# Patient Record
Sex: FEMALE | Race: BLACK OR AFRICAN AMERICAN
[De-identification: names, ages, dates, MRNs, and addresses within clinical notes are randomized per-mention and may not be internally consistent; named-entity substitution may affect disease eponyms.]

---

## 2017-01-17 ENCOUNTER — CHARTING TRANS (OUTPATIENT)
Dept: OTHER | Age: 52
End: 2017-01-17

## 2017-02-10 ENCOUNTER — HOSPITAL ENCOUNTER (EMERGENCY)
Facility: HOSPITAL | Age: 52
Discharge: LEFT BEFORE DISCHARGE | End: 2017-02-10
Attending: EMERGENCY MEDICINE
Payer: MEDICAID

## 2017-02-10 ENCOUNTER — PRIOR ORIGINAL RECORDS (OUTPATIENT)
Dept: OTHER | Age: 52
End: 2017-02-10

## 2017-02-10 ENCOUNTER — APPOINTMENT (OUTPATIENT)
Dept: GENERAL RADIOLOGY | Facility: HOSPITAL | Age: 52
End: 2017-02-10
Attending: EMERGENCY MEDICINE
Payer: MEDICAID

## 2017-02-10 VITALS
WEIGHT: 108 LBS | SYSTOLIC BLOOD PRESSURE: 124 MMHG | TEMPERATURE: 98 F | BODY MASS INDEX: 19.88 KG/M2 | HEART RATE: 76 BPM | RESPIRATION RATE: 18 BRPM | OXYGEN SATURATION: 98 % | DIASTOLIC BLOOD PRESSURE: 50 MMHG | HEIGHT: 62 IN

## 2017-02-10 DIAGNOSIS — R07.9 CHEST PAIN OF UNCERTAIN ETIOLOGY: Primary | ICD-10-CM

## 2017-02-10 LAB
ALBUMIN SERPL-MCNC: 3.8 G/DL (ref 3.5–4.8)
ALP LIVER SERPL-CCNC: 59 U/L (ref 39–100)
ALT SERPL-CCNC: 41 U/L (ref 14–54)
AST SERPL-CCNC: 17 U/L (ref 15–41)
ATRIAL RATE: 86 BPM
BASOPHILS # BLD AUTO: 0.01 X10(3) UL (ref 0–0.1)
BASOPHILS NFR BLD AUTO: 0.1 %
BILIRUB SERPL-MCNC: 0.2 MG/DL (ref 0.1–2)
BILIRUB UR QL STRIP.AUTO: NEGATIVE
BUN BLD-MCNC: 9 MG/DL (ref 8–20)
CALCIUM BLD-MCNC: 8.3 MG/DL (ref 8.3–10.3)
CHLORIDE: 105 MMOL/L (ref 101–111)
CLARITY UR REFRACT.AUTO: CLEAR
CO2: 28 MMOL/L (ref 22–32)
CREAT BLD-MCNC: 0.75 MG/DL (ref 0.55–1.02)
D-DIMER: <0.27 UG/ML FEU (ref 0–0.49)
EOSINOPHIL # BLD AUTO: 0.03 X10(3) UL (ref 0–0.3)
EOSINOPHIL NFR BLD AUTO: 0.4 %
ERYTHROCYTE [DISTWIDTH] IN BLOOD BY AUTOMATED COUNT: 12.9 % (ref 11.5–16)
GLUCOSE BLD-MCNC: 85 MG/DL (ref 70–99)
GLUCOSE UR STRIP.AUTO-MCNC: NEGATIVE MG/DL
HCT VFR BLD AUTO: 39.2 % (ref 34–50)
HGB BLD-MCNC: 12.9 G/DL (ref 12–16)
IMMATURE GRANULOCYTE COUNT: 0.02 X10(3) UL (ref 0–1)
IMMATURE GRANULOCYTE RATIO %: 0.3 %
KETONES UR STRIP.AUTO-MCNC: NEGATIVE MG/DL
LEUKOCYTE ESTERASE UR QL STRIP.AUTO: NEGATIVE
LYMPHOCYTES # BLD AUTO: 1.72 X10(3) UL (ref 0.9–4)
LYMPHOCYTES NFR BLD AUTO: 25 %
M PROTEIN MFR SERPL ELPH: 7.4 G/DL (ref 6.1–8.3)
MCH RBC QN AUTO: 29.6 PG (ref 27–33.2)
MCHC RBC AUTO-ENTMCNC: 32.9 G/DL (ref 31–37)
MCV RBC AUTO: 89.9 FL (ref 81–100)
MONOCYTES # BLD AUTO: 0.43 X10(3) UL (ref 0.1–0.6)
MONOCYTES NFR BLD AUTO: 6.3 %
NEUTROPHIL ABS PRELIM: 4.66 X10 (3) UL (ref 1.3–6.7)
NEUTROPHILS # BLD AUTO: 4.66 X10(3) UL (ref 1.3–6.7)
NEUTROPHILS NFR BLD AUTO: 67.9 %
NITRITE UR QL STRIP.AUTO: NEGATIVE
P AXIS: 71 DEGREES
P-R INTERVAL: 162 MS
PH UR STRIP.AUTO: 7 [PH] (ref 4.5–8)
PLATELET # BLD AUTO: 207 10(3)UL (ref 150–450)
POTASSIUM SERPL-SCNC: 3.4 MMOL/L (ref 3.6–5.1)
PROT UR STRIP.AUTO-MCNC: NEGATIVE MG/DL
Q-T INTERVAL: 400 MS
QRS DURATION: 80 MS
QTC CALCULATION (BEZET): 478 MS
R AXIS: 46 DEGREES
RBC # BLD AUTO: 4.36 X10(6)UL (ref 3.8–5.1)
RBC UR QL AUTO: NEGATIVE
RED CELL DISTRIBUTION WIDTH-SD: 42.5 FL (ref 35.1–46.3)
SODIUM SERPL-SCNC: 139 MMOL/L (ref 136–144)
SP GR UR STRIP.AUTO: 1.01 (ref 1–1.03)
T AXIS: 74 DEGREES
TROPONIN: <0.046 NG/ML (ref ?–0.05)
UROBILINOGEN UR STRIP.AUTO-MCNC: <2 MG/DL
VENTRICULAR RATE: 86 BPM
WBC # BLD AUTO: 6.9 X10(3) UL (ref 4–13)

## 2017-02-10 PROCEDURE — 85025 COMPLETE CBC W/AUTO DIFF WBC: CPT | Performed by: EMERGENCY MEDICINE

## 2017-02-10 PROCEDURE — 85378 FIBRIN DEGRADE SEMIQUANT: CPT | Performed by: EMERGENCY MEDICINE

## 2017-02-10 PROCEDURE — 81003 URINALYSIS AUTO W/O SCOPE: CPT | Performed by: EMERGENCY MEDICINE

## 2017-02-10 PROCEDURE — 93005 ELECTROCARDIOGRAM TRACING: CPT

## 2017-02-10 PROCEDURE — 99285 EMERGENCY DEPT VISIT HI MDM: CPT

## 2017-02-10 PROCEDURE — 96374 THER/PROPH/DIAG INJ IV PUSH: CPT

## 2017-02-10 PROCEDURE — 93010 ELECTROCARDIOGRAM REPORT: CPT

## 2017-02-10 PROCEDURE — 84484 ASSAY OF TROPONIN QUANT: CPT | Performed by: EMERGENCY MEDICINE

## 2017-02-10 PROCEDURE — 80053 COMPREHEN METABOLIC PANEL: CPT | Performed by: EMERGENCY MEDICINE

## 2017-02-10 PROCEDURE — 71020 XR CHEST PA + LAT CHEST (CPT=71020): CPT

## 2017-02-10 RX ORDER — KETOROLAC TROMETHAMINE 30 MG/ML
30 INJECTION, SOLUTION INTRAMUSCULAR; INTRAVENOUS ONCE
Status: COMPLETED | OUTPATIENT
Start: 2017-02-10 | End: 2017-02-10

## 2017-02-10 RX ORDER — DIAZEPAM 5 MG/1
5 TABLET ORAL EVERY 6 HOURS PRN
COMMUNITY
End: 2017-03-13

## 2017-02-10 RX ORDER — OMEPRAZOLE 20 MG/1
20 CAPSULE, DELAYED RELEASE ORAL
COMMUNITY
End: 2017-03-13

## 2017-02-10 RX ORDER — GABAPENTIN 100 MG/1
100 CAPSULE ORAL 3 TIMES DAILY
COMMUNITY
End: 2017-03-13

## 2017-02-10 RX ORDER — CHOLECALCIFEROL (VITAMIN D3) 1250 MCG
CAPSULE ORAL
COMMUNITY

## 2017-02-10 RX ORDER — ONDANSETRON 4 MG/1
4 TABLET, ORALLY DISINTEGRATING ORAL EVERY 8 HOURS PRN
COMMUNITY
End: 2017-03-13

## 2017-02-10 NOTE — ED NOTES
Pt awake and alert, appears comfortable and in no distress. Stressed to pt importance of follow up related to chest pain. Pt verbalizes understanding. Pt d/c'd with belongings with steady gait.

## 2017-02-10 NOTE — ED INITIAL ASSESSMENT (HPI)
Pt presents to the ED with complaints of pressure to the chest which has been constant for past month. Pt also c/o burning pain to hands. Pt states she was seen at [facility] ER for same pain approximately 3 weeks ago and was told everything was ok.  Pt states sh

## 2017-02-10 NOTE — ED NOTES
Pt states she needs to leave to  her children. Pt states, \"I just realized it was my day to pick them up and they are waiting for me\".  Discussed with Dr. Suki Graff and if pt would like to leave at this time then pt will need to sign out against medic

## 2017-02-10 NOTE — ED PROVIDER NOTES
Patient Seen in: [facility] Emergency Department    History   Patient presents with:  Chest Pain Angina (cardiovascular)    Stated Complaint: chest pain    HPI    60-year-old -American female who presents the emergency room today for bit of c Packs/Day: 0.00  Years:           Review of Systems    Positive for stated complaint: chest pain  Other systems are as noted in HPI. Constitutional and vital signs reviewed. All other systems reviewed and negative except as noted above.     PSF value of  approximately 95% when results are used as part of the total clinical  evaluation of the patient.     1st Trimester:  0.05-0.95 ug/mL (FEU)     2nd Trimester:  0.32-1.29 ug/mL (FEU)    3rd Trimester:  0.13-1.70 ug/mL (FEU)    In pregnant females, disposition on the patient and see if any further workup needed to be done urgently. Patient stated she still did not wish to stay and signed out [address]   Patient was advised that if despite chest pains worsen she should probably return to

## 2017-03-13 ENCOUNTER — HOSPITAL ENCOUNTER (OUTPATIENT)
Age: 52
Discharge: HOME | End: 2017-03-13
Attending: FAMILY MEDICINE
Payer: MEDICAID

## 2017-03-13 ENCOUNTER — NURSE ONLY (OUTPATIENT)
Dept: FAMILY MEDICINE CLINIC | Facility: CLINIC | Age: 52
End: 2017-03-13

## 2017-03-13 ENCOUNTER — APPOINTMENT (OUTPATIENT)
Dept: GENERAL RADIOLOGY | Age: 52
End: 2017-03-13
Attending: FAMILY MEDICINE
Payer: MEDICAID

## 2017-03-13 VITALS
OXYGEN SATURATION: 100 % | BODY MASS INDEX: 19.88 KG/M2 | DIASTOLIC BLOOD PRESSURE: 70 MMHG | HEART RATE: 85 BPM | TEMPERATURE: 99 F | RESPIRATION RATE: 16 BRPM | HEIGHT: 62 IN | SYSTOLIC BLOOD PRESSURE: 118 MMHG | WEIGHT: 108 LBS

## 2017-03-13 VITALS
TEMPERATURE: 99 F | HEIGHT: 62 IN | RESPIRATION RATE: 14 BRPM | SYSTOLIC BLOOD PRESSURE: 106 MMHG | OXYGEN SATURATION: 98 % | BODY MASS INDEX: 19.99 KG/M2 | DIASTOLIC BLOOD PRESSURE: 72 MMHG | HEART RATE: 85 BPM | WEIGHT: 108.63 LBS

## 2017-03-13 DIAGNOSIS — Z02.9 ENCOUNTERS FOR ADMINISTRATIVE PURPOSES: Primary | ICD-10-CM

## 2017-03-13 DIAGNOSIS — J11.1 INFLUENZA: Primary | ICD-10-CM

## 2017-03-13 PROCEDURE — 99213 OFFICE O/P EST LOW 20 MIN: CPT

## 2017-03-13 PROCEDURE — 71020 XR CHEST PA + LAT CHEST (CPT=71020): CPT

## 2017-03-13 PROCEDURE — 99214 OFFICE O/P EST MOD 30 MIN: CPT

## 2017-03-13 RX ORDER — OSELTAMIVIR PHOSPHATE 75 MG/1
75 CAPSULE ORAL 2 TIMES DAILY
Qty: 10 CAPSULE | Refills: 0 | Status: SHIPPED | OUTPATIENT
Start: 2017-03-13 | End: 2017-03-18

## 2017-03-13 NOTE — PROGRESS NOTES
S:  Pt reports URI sxs since yesterday. Pt reports cough (barky), nasal congestion, sinus pain/pressure, ear pain, yellow mucous, sore throat rated 8/10, chills, sweats, coughing fits, SOB (although explains baseline due to lupus), nausea, diarrhea.   Pt d

## 2017-03-13 NOTE — ED PROVIDER NOTES
Patient presents with:  Cough/URI  Diarrhea  Headache  Body ache and/or chills    HPI:     Jose Eduardo Perez is a 49year old female who presents with for chief complaint of fever, chills, nasal congestion, coryza, rhinorrhea, sore throat, chest congesti discharge, mild congestion, no sinus tenderness. No nasal flaring  Throat: lips, mucosa, and tongue normal; teeth and gums normal. Buccal mucosa moist.   Neck: no adenopathy  Lungs: clear to auscultation bilaterally.  No wheezing, rhonchi or crackles No alex STATED HISTORY:  Patient has had cough, congestion, chills, fever body aches, head ache and diarrhea since yesterday. FINDINGS:  Cardiac silhouette and pulmonary vasculature within normal limits. No focal consolidation, pneumothorax or pleural effusion.

## 2017-04-02 ENCOUNTER — HOSPITAL ENCOUNTER (OUTPATIENT)
Age: 52
Discharge: HOME | End: 2017-04-02
Payer: MEDICAID

## 2017-04-02 VITALS
OXYGEN SATURATION: 100 % | RESPIRATION RATE: 16 BRPM | WEIGHT: 105 LBS | SYSTOLIC BLOOD PRESSURE: 117 MMHG | DIASTOLIC BLOOD PRESSURE: 74 MMHG | TEMPERATURE: 99 F | HEART RATE: 64 BPM | BODY MASS INDEX: 19 KG/M2

## 2017-04-02 DIAGNOSIS — B35.1 TINEA OF NAIL: Primary | ICD-10-CM

## 2017-04-02 PROCEDURE — 99212 OFFICE O/P EST SF 10 MIN: CPT

## 2017-04-02 RX ORDER — MELOXICAM 10 MG/1
CAPSULE ORAL
COMMUNITY

## 2017-04-02 NOTE — ED PROVIDER NOTES
[place] [address] [facility]    History   Patient presents with:  Fungus Nails    Stated Complaint: finger infection/mold    HPI    50 yo female here with c/o greenish color to her R hand 4th digit nail after the acrylics were removed Constitutional: She is oriented to person, place, and time. She appears well-developed and well-nourished. HENT:   Head: Normocephalic. Eyes: Conjunctivae and EOM are normal. Pupils are equal, round, and reactive to light.    Neck: Normal range of motio

## 2017-05-10 ENCOUNTER — PRIOR ORIGINAL RECORDS (OUTPATIENT)
Dept: OTHER | Age: 52
End: 2017-05-10

## 2017-07-07 ENCOUNTER — PRIOR ORIGINAL RECORDS (OUTPATIENT)
Dept: OTHER | Age: 52
End: 2017-07-07

## 2017-07-24 LAB
BUN: 9 MG/DL
CALCIUM: 8.3 MG/DL
CHLORIDE: 105 MEQ/L
CREATININE, SERUM: 0.75 MG/DL
GLUCOSE: 85 MG/DL
HEMATOCRIT: 39.2 %
HEMOGLOBIN: 12.9 G/DL
MCH: 29.6 PG
MCHC: 32.9 G/DL
MCV: 89.9 FL
PLATELETS: 207 K/UL
POTASSIUM, SERUM: 3.4 MEQ/L
RED BLOOD COUNT: 4.36 X 10-6/U
SODIUM: 139 MEQ/L
WHITE BLOOD COUNT: 6.9 X 10-3/U

## 2017-07-25 ENCOUNTER — MYAURORA ACCOUNT LINK (OUTPATIENT)
Dept: OTHER | Age: 52
End: 2017-07-25

## 2017-07-25 ENCOUNTER — PRIOR ORIGINAL RECORDS (OUTPATIENT)
Dept: OTHER | Age: 52
End: 2017-07-25

## 2017-07-25 LAB
ALBUMIN: 4.1 G/DL
ALBUMIN: 4.3 G/DL
ALKALINE PHOSPHATATE(ALK PHOS): 45 IU/L
ALKALINE PHOSPHATATE(ALK PHOS): 51 IU/L
BILIRUBIN TOTAL: 0.3 MG/DL
BILIRUBIN TOTAL: 0.3 MG/DL
BUN: 14 MG/DL
BUN: 9 MG/DL
CALCIUM: 8.5 MG/DL
CALCIUM: 9.4 MG/DL
CHLORIDE: 102 MEQ/L
CHLORIDE: 104 MEQ/L
CREATININE, SERUM: 0.7 MG/DL
CREATININE, SERUM: 0.76 MG/DL
GLOBULIN: 3.1 G/DL
GLUCOSE: 113 MG/DL
GLUCOSE: 91 MG/DL
HEMATOCRIT: 38.7 %
HEMATOCRIT: 40.1 %
HEMOGLOBIN: 12.8 G/DL
HEMOGLOBIN: 13.2 G/DL
MCH: 28.8 PG
MCH: 28.9 PG
MCHC: 32.9 G/DL
MCHC: 32.9 G/DL
MCV: 87.6 FL
MCV: 87.9 FL
PLATELETS: 199 K/UL
PLATELETS: 203 K/UL
POTASSIUM, SERUM: 3.5 MEQ/L
POTASSIUM, SERUM: 3.6 MEQ/L
PROTEIN, TOTAL: 7.2 G/DL
PROTEIN, TOTAL: 7.3 G/DL
RED BLOOD COUNT: 4.42 X 10-6/U
RED BLOOD COUNT: 4.56 X 10-6/U
SGOT (AST): 16 IU/L
SGOT (AST): 18 IU/L
SGPT (ALT): 16 IU/L
SGPT (ALT): 29 IU/L
SODIUM: 139 MEQ/L
SODIUM: 140 MEQ/L
WHITE BLOOD COUNT: 5.6 X 10-3/U
WHITE BLOOD COUNT: 6.23 X 10-3/U

## 2017-09-20 NOTE — ED AVS SNAPSHOT
[facility] in [address] [address]    Phone:  [phone]    Fax:  [phone]           Mrs. Marium Alvarez   MRN: [id number]    Department:  [facility] in [facility]   Date of Visit:  3/13/2017 See prior TE. If you have any problems with your follow-up, please call our  at [phone]. Si usted tiene algun problema con de la rosa sequimiento, por favor llame a nuestro adminstrador de casos al [phone].     Expect to receive an electronic reques Yasemin Pacheco [address] ([address]) Veda ([address] [id number] [facility]   [facility] [address] ([address] [facility]) [address]  (Do PROCEDURE:  XR CHEST PA + LAT CHEST (CPT=71020)     INDICATIONS:  coughing chills fatigue headache chest very heavy     COMPARISON:  [facility] , XR CHEST PA + LAT CHEST (CPT=71020), 2/10/2017, 11:39.     TECHNIQUE:  PA and lateral chest radiographs were obta

## 2019-01-24 ENCOUNTER — IMAGING SERVICES (OUTPATIENT)
Dept: OTHER | Age: 54
End: 2019-01-24

## 2019-02-28 VITALS
DIASTOLIC BLOOD PRESSURE: 62 MMHG | HEIGHT: 62 IN | OXYGEN SATURATION: 98 % | RESPIRATION RATE: 16 BRPM | WEIGHT: 113 LBS | SYSTOLIC BLOOD PRESSURE: 104 MMHG | BODY MASS INDEX: 20.8 KG/M2 | HEART RATE: 80 BPM

## 2019-07-08 ENCOUNTER — HOSPITAL ENCOUNTER (OUTPATIENT)
Age: 54
Discharge: HOME | End: 2019-07-08
Attending: FAMILY MEDICINE
Payer: COMMERCIAL

## 2019-07-08 VITALS
TEMPERATURE: 98 F | HEART RATE: 81 BPM | DIASTOLIC BLOOD PRESSURE: 68 MMHG | RESPIRATION RATE: 16 BRPM | OXYGEN SATURATION: 98 % | SYSTOLIC BLOOD PRESSURE: 128 MMHG

## 2019-07-08 DIAGNOSIS — Z11.3 ROUTINE SCREENING FOR STI (SEXUALLY TRANSMITTED INFECTION): ICD-10-CM

## 2019-07-08 DIAGNOSIS — N76.0 ACUTE VAGINITIS: Primary | ICD-10-CM

## 2019-07-08 LAB
POCT BILIRUBIN URINE: NEGATIVE
POCT GLUCOSE URINE: NEGATIVE MG/DL
POCT LEUKOCYTE ESTERASE URINE: NEGATIVE
POCT NITRITE URINE: NEGATIVE
POCT PH URINE: 6.5 (ref 5–8)
POCT SPECIFIC GRAVITY URINE: 1.02
POCT URINE CLARITY: CLEAR
POCT URINE COLOR: YELLOW
POCT UROBILINOGEN URINE: 0.2 MG/DL

## 2019-07-08 PROCEDURE — 87491 CHLMYD TRACH DNA AMP PROBE: CPT | Performed by: FAMILY MEDICINE

## 2019-07-08 PROCEDURE — 87480 CANDIDA DNA DIR PROBE: CPT | Performed by: FAMILY MEDICINE

## 2019-07-08 PROCEDURE — 99214 OFFICE O/P EST MOD 30 MIN: CPT

## 2019-07-08 PROCEDURE — 87591 N.GONORRHOEAE DNA AMP PROB: CPT | Performed by: FAMILY MEDICINE

## 2019-07-08 PROCEDURE — 87510 GARDNER VAG DNA DIR PROBE: CPT | Performed by: FAMILY MEDICINE

## 2019-07-08 PROCEDURE — 87660 TRICHOMONAS VAGIN DIR PROBE: CPT | Performed by: FAMILY MEDICINE

## 2019-07-08 PROCEDURE — 81002 URINALYSIS NONAUTO W/O SCOPE: CPT | Performed by: FAMILY MEDICINE

## 2019-07-08 RX ORDER — FLUCONAZOLE 150 MG/1
TABLET ORAL
Qty: 2 TABLET | Refills: 0 | Status: SHIPPED | OUTPATIENT
Start: 2019-07-08 | End: 2019-07-11

## 2019-07-08 RX ORDER — METRONIDAZOLE 7.5 MG/G
1 GEL VAGINAL NIGHTLY
Qty: 70 G | Refills: 0 | Status: SHIPPED | OUTPATIENT
Start: 2019-07-08 | End: 2019-07-13

## 2019-07-08 NOTE — ED PROVIDER NOTES
Patient Seen in: [address] [facility]    History   Patient presents with:  Dysuria  Eval-G (genital)    Stated Complaint: Frequent Urination and Discharge (x4 days)    HPI  48 yo F here with complaints of urinary frequency - this is baseline a pain, no HSM, no guarding or rebound, not distended  NEURO: Alert and oriented to person place and time  GAIT: Normal  GENITAL EXAM: Normal female external genitalia, on Spec exam - copious fishy odor yellowish tinged discharge noted, normal vaginal walls infection)    Disposition:  Discharge  7/8/2019  6:18 pm    Follow-up:  Alicia Blackwell [phone]    In 1 week  As needed        Medications Prescribed:  Discharge Medication List as of 7/8/2019  6:20 PM    START Yessy Gardiner

## 2019-07-09 LAB
C TRACH DNA SPEC QL NAA+PROBE: NEGATIVE
N GONORRHOEA DNA SPEC QL NAA+PROBE: NEGATIVE

## (undated) NOTE — ED AVS SNAPSHOT
[facility] in [address] [address]    Phone:  [phone]    Fax:  [phone]           Mrs. Mónica Cardona   MRN: [id number]    Department:  [facility] in [facility]   Date of Visit:  4/2/2017 If you have any problems with your follow-up, please call our  at [phone]. Si usted tiene algun problema con de la rosa sequimiento, por favor llame a nuestro adminstrador de casos al [phone].     Expect to receive an electronic reques Yasemin [employer] [address] ([address]) [address] One[id number]   [facility] [address] ([address] [facility]) [address]  (Do Enter your "[employer] Activation Code exactly as it appears below along with your Zip Code and Date of Birth to complete the sign-up process. If you do not sign up before the expiration date, you must request a new code.     Your unique "[employer] Access Code: Phong [place]

## (undated) NOTE — LETTER
[facility] CARE IN [address] [address]  Dept: [phone]  Dept Fax: [phone]      March 13, 2017    Patient: Linda Costello   Date of Visit: 3/13/2017       To Whom It May Concern:    Austin Shannon was seen and treat

## (undated) NOTE — ED AVS SNAPSHOT
[facility] Emergency Department    [facility]  [address] [address]    Phone:  [phone]    Fax:  [phone]           Mrs. Noel Mullen   MRN: [id number]    Department:  [facility] Emergency Department   Date of [place] To Check ER Wait Times:  TEXT 'ERwait' to 08947      Click [email or web address]      Or call [phone]    If you have any problems with your follow-up, please call our  at [phone]    Si usted tiene algun problema con de la rosa sequimiento, por f I have read and understand the instructions given to me by my caregivers. 24-Hour Pharmacies        Pharmacy Address Phone Number   [address] [facility] ([address]) [address] [place] [address].  [facility] & INDICATIONS:  chest pain     COMPARISON:  None. TECHNIQUE:  PA and lateral chest radiographs were obtained. PATIENT STATED HISTORY:  Centered chest and back pain for a month.          Findings:     Cardiac silhouette and pulmonary vasculature are un

## (undated) NOTE — ED AVS SNAPSHOT
[facility] Emergency Department    [place] [address]    Phone:  [phone]    Fax:  [phone]           Mrs. Kelle Israel   MRN: [id number]    Department:  [facility] Emergency Department   Date of [place] IF THERE IS ANY CHANGE OR WORSENING OF YOUR CONDITION, CALL YOUR PRIMARY CARE PHYSICIAN AT ONCE OR RETURN IMMEDIATELY TO THE EMERGENCY DEPARTMENT.     If you have been prescribed any medication(s), please fill your prescription right away and begin taking t

## (undated) NOTE — MR AVS SNAPSHOT
[address] [facility]  Jayleen Kaminski [id number] [phone]               Thank you for choosing us for your health care visit with DAHIANA Sauceda.   We are glad to serve you and happy to provide you with [facility] Enter your [employer] Activation Code exactly as it appears below along with your Zip Code and Date of Birth to complete the sign-up process. If you do not sign up before the expiration date, you must request a new code.     Your unique [employer] Access Code: Zonia Bingham

## (undated) NOTE — LETTER
Date & Time: 2/10/2017, 2:47 PM  Patient: Maisha Grimaldo  Attending Provider: Chhaya Craig MD      This certifies that Mohit Edwards, a patient at an [facility] facility, am leaving the facility voluntarily and huseyin